# Patient Record
Sex: FEMALE | Race: BLACK OR AFRICAN AMERICAN
[De-identification: names, ages, dates, MRNs, and addresses within clinical notes are randomized per-mention and may not be internally consistent; named-entity substitution may affect disease eponyms.]

---

## 2023-04-06 ENCOUNTER — APPOINTMENT (OUTPATIENT)
Dept: INTERNAL MEDICINE | Facility: CLINIC | Age: 28
End: 2023-04-06
Payer: COMMERCIAL

## 2023-04-06 ENCOUNTER — NON-APPOINTMENT (OUTPATIENT)
Age: 28
End: 2023-04-06

## 2023-04-06 VITALS
OXYGEN SATURATION: 98 % | RESPIRATION RATE: 13 BRPM | SYSTOLIC BLOOD PRESSURE: 110 MMHG | DIASTOLIC BLOOD PRESSURE: 60 MMHG | HEART RATE: 68 BPM | TEMPERATURE: 97.6 F

## 2023-04-06 DIAGNOSIS — Z00.00 ENCOUNTER FOR GENERAL ADULT MEDICAL EXAMINATION W/OUT ABNORMAL FINDINGS: ICD-10-CM

## 2023-04-06 DIAGNOSIS — Z86.2 PERSONAL HISTORY OF DISEASES OF THE BLOOD AND BLOOD-FORMING ORGANS AND CERTAIN DISORDERS INVOLVING THE IMMUNE MECHANISM: ICD-10-CM

## 2023-04-06 DIAGNOSIS — Z82.3 FAMILY HISTORY OF STROKE: ICD-10-CM

## 2023-04-06 DIAGNOSIS — L85.3 XEROSIS CUTIS: ICD-10-CM

## 2023-04-06 DIAGNOSIS — D17.9 BENIGN LIPOMATOUS NEOPLASM, UNSPECIFIED: ICD-10-CM

## 2023-04-06 DIAGNOSIS — F90.9 ATTENTION-DEFICIT HYPERACTIVITY DISORDER, UNSPECIFIED TYPE: ICD-10-CM

## 2023-04-06 PROCEDURE — 99385 PREV VISIT NEW AGE 18-39: CPT | Mod: 25

## 2023-04-06 RX ORDER — DEXTROAMPHETAMINE SULFATE, DEXTROAMPHETAMINE SACCHARATE, AMPHETAMINE SULFATE AND AMPHETAMINE ASPARTATE 5; 5; 5; 5 MG/1; MG/1; MG/1; MG/1
20 CAPSULE, EXTENDED RELEASE ORAL
Refills: 0 | Status: ACTIVE | COMMUNITY

## 2023-04-06 RX ORDER — LORAZEPAM 2 MG/1
TABLET ORAL 3 TIMES DAILY
Refills: 0 | Status: ACTIVE | COMMUNITY

## 2023-04-07 LAB
ALBUMIN SERPL ELPH-MCNC: 4.6 G/DL
ALP BLD-CCNC: 55 U/L
ALT SERPL-CCNC: 14 U/L
ANION GAP SERPL CALC-SCNC: 14 MMOL/L
AST SERPL-CCNC: 20 U/L
BASOPHILS # BLD AUTO: 0.03 K/UL
BASOPHILS NFR BLD AUTO: 0.5 %
BILIRUB SERPL-MCNC: 0.4 MG/DL
BUN SERPL-MCNC: 21 MG/DL
CALCIUM SERPL-MCNC: 9.1 MG/DL
CHLORIDE SERPL-SCNC: 104 MMOL/L
CHOLEST SERPL-MCNC: 171 MG/DL
CO2 SERPL-SCNC: 23 MMOL/L
CREAT SERPL-MCNC: 0.67 MG/DL
EGFR: 124 ML/MIN/1.73M2
EOSINOPHIL # BLD AUTO: 0.16 K/UL
EOSINOPHIL NFR BLD AUTO: 2.8 %
ESTIMATED AVERAGE GLUCOSE: 100 MG/DL
GLUCOSE SERPL-MCNC: 89 MG/DL
HBA1C MFR BLD HPLC: 5.1 %
HCT VFR BLD CALC: 40.4 %
HCV AB SER QL: NONREACTIVE
HCV S/CO RATIO: 0.09 S/CO
HDLC SERPL-MCNC: 87 MG/DL
HGB BLD-MCNC: 12.4 G/DL
HIV1+2 AB SPEC QL IA.RAPID: NONREACTIVE
IMM GRANULOCYTES NFR BLD AUTO: 0.2 %
LDLC SERPL CALC-MCNC: 75 MG/DL
LYMPHOCYTES # BLD AUTO: 2.2 K/UL
LYMPHOCYTES NFR BLD AUTO: 39.1 %
MAN DIFF?: NORMAL
MCHC RBC-ENTMCNC: 27.5 PG
MCHC RBC-ENTMCNC: 30.7 GM/DL
MCV RBC AUTO: 89.6 FL
MONOCYTES # BLD AUTO: 0.32 K/UL
MONOCYTES NFR BLD AUTO: 5.7 %
NEUTROPHILS # BLD AUTO: 2.91 K/UL
NEUTROPHILS NFR BLD AUTO: 51.7 %
NONHDLC SERPL-MCNC: 84 MG/DL
PLATELET # BLD AUTO: 238 K/UL
POTASSIUM SERPL-SCNC: 4.3 MMOL/L
PROT SERPL-MCNC: 6.9 G/DL
RBC # BLD: 4.51 M/UL
RBC # FLD: 14.8 %
SODIUM SERPL-SCNC: 140 MMOL/L
TRIGL SERPL-MCNC: 42 MG/DL
WBC # FLD AUTO: 5.63 K/UL

## 2023-07-07 ENCOUNTER — APPOINTMENT (OUTPATIENT)
Dept: OTOLARYNGOLOGY | Facility: CLINIC | Age: 28
End: 2023-07-07
Payer: COMMERCIAL

## 2023-07-07 VITALS — WEIGHT: 127 LBS | BODY MASS INDEX: 21.16 KG/M2 | HEIGHT: 65 IN

## 2023-07-07 DIAGNOSIS — J34.89 OTHER SPECIFIED DISORDERS OF NOSE AND NASAL SINUSES: ICD-10-CM

## 2023-07-07 DIAGNOSIS — R22.0 LOCALIZED SWELLING, MASS AND LUMP, HEAD: ICD-10-CM

## 2023-07-07 PROCEDURE — 99204 OFFICE O/P NEW MOD 45 MIN: CPT | Mod: 25

## 2023-07-07 PROCEDURE — 31231 NASAL ENDOSCOPY DX: CPT

## 2023-07-07 RX ORDER — LEVOCETIRIZINE DIHYDROCHLORIDE 5 MG/1
5 TABLET ORAL DAILY
Qty: 1 | Refills: 3 | Status: ACTIVE | COMMUNITY
Start: 2023-07-07 | End: 1900-01-01

## 2023-07-07 RX ORDER — AZELASTINE HYDROCHLORIDE 137 UG/1
0.1 SPRAY, METERED NASAL
Qty: 1 | Refills: 3 | Status: ACTIVE | COMMUNITY
Start: 2023-07-07 | End: 1900-01-01

## 2023-07-07 NOTE — PROCEDURE
[None] : none [Flexible Endoscope] : examined with the flexible endoscope [Congested] : congested [S-Shaped Deviated] : S-shape deviation [FreeTextEntry6] : The following anatomic sites were directly examined in a sequential fashion:\par The scope was introduced in the nasal passage between the middle and inferior turbinates to exam the inferior portion of the middle meatus and the fontanelle, as well as the maxillary ostia. Next, the scope was passed medically and posteriorly to the middle turbinates to examine the sphenoethmoid recess and the superior turbinate region.\par turbinate hypertrophy\par  [de-identified] : nasal congestion

## 2023-07-07 NOTE — PHYSICAL EXAM
[de-identified] : posterior auricuaalr lymph node  [Nasal Endoscopy Performed] : nasal endoscopy was performed, see procedure section for findings [] : septum deviated bilaterally [de-identified] : nasal valve collapse, improvement with [place] maneuver and lateralization [de-identified] : edema  [Normal] : mucosa is normal [Midline] : trachea located in midline position

## 2023-07-07 NOTE — HISTORY OF PRESENT ILLNESS
[de-identified] : 28y F referred by Dr. Laws for lump behind ear. Pt had had bump for six years. It has not changed size. She finds it annoying when laying on it or touching it. Pt had a CT scan in the past and it was benign at that point. \par \par \par Pt has severe nasal congestion and has history. Pt has symptoms at night and in the morning. Pt uses saline with some relief. symptoms have been constant for years.

## 2023-07-27 ENCOUNTER — APPOINTMENT (OUTPATIENT)
Dept: CT IMAGING | Facility: CLINIC | Age: 28
End: 2023-07-27
Payer: COMMERCIAL

## 2023-07-27 PROCEDURE — 70481 CT ORBIT/EAR/FOSSA W/DYE: CPT

## 2023-07-27 PROCEDURE — 70486 CT MAXILLOFACIAL W/O DYE: CPT

## 2023-08-01 ENCOUNTER — TRANSCRIPTION ENCOUNTER (OUTPATIENT)
Age: 28
End: 2023-08-01

## 2023-08-11 ENCOUNTER — APPOINTMENT (OUTPATIENT)
Dept: OTOLARYNGOLOGY | Facility: CLINIC | Age: 28
End: 2023-08-11
Payer: COMMERCIAL

## 2023-08-11 DIAGNOSIS — J34.89 OTHER SPECIFIED DISORDERS OF NOSE AND NASAL SINUSES: ICD-10-CM

## 2023-08-11 DIAGNOSIS — J34.2 DEVIATED NASAL SEPTUM: ICD-10-CM

## 2023-08-11 DIAGNOSIS — J34.3 HYPERTROPHY OF NASAL TURBINATES: ICD-10-CM

## 2023-08-11 DIAGNOSIS — R59.9 ENLARGED LYMPH NODES, UNSPECIFIED: ICD-10-CM

## 2023-08-11 PROCEDURE — 99214 OFFICE O/P EST MOD 30 MIN: CPT | Mod: 25

## 2023-08-11 PROCEDURE — 31231 NASAL ENDOSCOPY DX: CPT

## 2023-08-11 NOTE — HISTORY OF PRESENT ILLNESS
[de-identified] : 28y F with lump behind the ear returns for CT SCAN review that shows postauricular lymph node ..Pt contiune to have nasal obstruction and difficulty breathing, Pt can move her nose and she can then breath.

## 2023-08-11 NOTE — PHYSICAL EXAM
[Nasal Endoscopy Performed] : nasal endoscopy was performed, see procedure section for findings [de-identified] : nasal valve collapse, improvement with [place] maneuver and lateralization [de-identified] : edema  [Normal] : mucosa is normal [Midline] : trachea located in midline position

## 2023-08-11 NOTE — PROCEDURE
[None] : none [Rigid Endoscope] : examined with a rigid endoscope [Congested] : congested [FreeTextEntry6] : The following anatomic sites were directly examined in a sequential fashion: The scope was introduced in the nasal passage between the middle and inferior turbinates to exam the inferior portion of the middle meatus and the fontanelle, as well as the maxillary ostia. Next, the scope was passed medically and posteriorly to the middle turbinates to examine the sphenoethmoid recess and the superior turbinate region.

## 2023-08-11 NOTE — ASSESSMENT
[FreeTextEntry1] : we discussed different options for treatment of nasal obstruction. Pt will speak with facial plastic surgeon.

## 2023-08-11 NOTE — DATA REVIEWED
[de-identified] : Test was reviewed and interpreted by me. See official report below.  CT TEMPORAL BONES IC  - ORDERED BY: ARI MARQUEZ                                                                         PROCEDURE DATE:  07/27/2023    INTERPRETATION:  CT EXAMINATION OF THE TEMPORAL BONES  CLINICAL INDICATION: Postauricular swelling.  TECHNIQUE: Multidetector axial CT images of the temporal bones were obtained with intravenous contrast. Multiplanar reformats were obtained. Contrast dose: 90 cc Omnipaque 350 IV contrast.  COMPARISON: None available.  FINDINGS:  RIGHT: Mastoid air cells: The mastoid air cells are well developed and clear. The tegmen mastoideum is intact.  Outer ear: The external auditory canal appears normal.   The tympanic membrane appears intact.  Middle ear: The middle ear cavity is clear.  The ossicles and scutum are intact without erosion. The tegmen tympani is intact.  Inner ear: The otic capsule and inner ear structures appear intact. The vestibular aqueduct is normal in size.  Facial nerve canal: Normal course and caliber.  Vascular structures:  The sigmoid plate is intact.  The carotid canal is covered by bone.  Internal auditory canal: Normal in size   LEFT: Mastoid: Skin marker placed over the mastoid bone. Deep to this, there is a subcutaneous soft tissue focus measuring approximately 0.9 x 0.5 cm. No infiltration of adjacent structures.  The mastoid air cells are well developed and clear. The tegmen mastoideum is intact.  Outer ear:  The external auditory canal appears normal.   The tympanic membrane appears intact.  Middle ear: The middle ear cavity is clear.  The ossicles and scutum are intact without erosion. The tegmen tympani is intact.  Inner ear: The otic capsule and inner ear structures appear intact. The vestibular aqueduct is normal in size.  Facial nerve canal: Normal course and caliber.  Vascular structures:  The sigmoid plate is intact.  The carotid canal is covered by bone.  Internal auditory canal: Normal in size   Other: Partially visualized intracranial structures: Normal. Partially visualizes orbits: Unremarkable. Partially visualized paranasal sinuses: Clear.  IMPRESSION: Subcentimeter soft tissue focus deep to the left mastoid skin marker, likely representing a normal or mildly reactive lymph node. CT SINUSES  - ORDERED BY: ARI MARQUEZ   PROCEDURE DATE:  07/27/2023    INTERPRETATION:  CT EXAMINATION OF THE PARANASAL SINUSES  CLINICAL INDICATION: Nasal obstruction status post surgery.  TECHNIQUE: CT examination of the paranasal sinuses was performed. These images were used to create axial, coronal and sagittal reformatted images through the paranasal sinuses. No intravenous contrast was administered.  The images were reviewed in bone and soft-tissue windows.  COMPARISON: None available  FINDINGS: Sinocranial & sinoorbital junctions: The lamina papyracea, cribriform plates and fovea ethmoidalis are intact.  Nasal septum and nasal cavity: Narrowing of the bilateral internal nasal valves suggested. Mild S-shaped nasal septal deviation. No mass identified.  Frontal sinuses, drainage pathways, and associated anatomic variants: The frontal sinuses are clear bilaterally. Clear frontal outflow tracts.  Agger nasi cells are present bilaterally.  Ethmoid sinuses: The ethmoid air cells are clear bilaterally.  Sphenoid sinuses, drainage pathways, and associated variants: The sphenoid sinuses and sphenoethmoidal recesses are clear bilaterally. The carotid canals are covered by bone.  Maxillary sinuses, drainage pathways, and associated variants: Trace mucosal thickening in the bilateral maxillary sinuses. Bilateral ostiomeatal units are patent.  Other: Partially visualized intracranial structures: Normal Orbits: Normal Mastoid air cells: Normal   IMPRESSION: Narrowing of the bilateral internal nasal valve suggested. Mild S-shaped nasal septal deviation. No mass identified.  Minimal inflammation in the maxillary sinuses.